# Patient Record
Sex: MALE | Race: WHITE
[De-identification: names, ages, dates, MRNs, and addresses within clinical notes are randomized per-mention and may not be internally consistent; named-entity substitution may affect disease eponyms.]

---

## 2018-03-20 ENCOUNTER — HOSPITAL ENCOUNTER (EMERGENCY)
Dept: HOSPITAL 17 - NEPJ | Age: 33
LOS: 2 days | Discharge: HOME | End: 2018-03-22
Payer: COMMERCIAL

## 2018-03-20 VITALS
DIASTOLIC BLOOD PRESSURE: 72 MMHG | SYSTOLIC BLOOD PRESSURE: 126 MMHG | OXYGEN SATURATION: 100 % | RESPIRATION RATE: 15 BRPM | TEMPERATURE: 97.6 F | HEART RATE: 92 BPM

## 2018-03-20 VITALS — HEIGHT: 66 IN | WEIGHT: 191.8 LBS | BODY MASS INDEX: 30.82 KG/M2

## 2018-03-20 VITALS — RESPIRATION RATE: 16 BRPM

## 2018-03-20 DIAGNOSIS — F14.10: ICD-10-CM

## 2018-03-20 DIAGNOSIS — F15.10: Primary | ICD-10-CM

## 2018-03-20 DIAGNOSIS — F60.2: ICD-10-CM

## 2018-03-20 PROCEDURE — 80307 DRUG TEST PRSMV CHEM ANLYZR: CPT

## 2018-03-20 PROCEDURE — 99285 EMERGENCY DEPT VISIT HI MDM: CPT

## 2018-03-20 PROCEDURE — 96374 THER/PROPH/DIAG INJ IV PUSH: CPT

## 2018-03-20 PROCEDURE — 96372 THER/PROPH/DIAG INJ SC/IM: CPT

## 2018-03-20 NOTE — PD
HPI


Chief Complaint:  Psychiatric Symptoms


Time Seen by Provider:  20:04


Travel History


International Travel<30 days:  No


Contact w/Intl Traveler<30days:  No


Traveled to known affect area:  No





History of Present Illness


HPI


This is a 32-year-old male who presents under Baker act initiated by [place] [employer].  According to his paperwork, "Shayna stated that he 

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  Shayna 

did not give any specifics as to how he would kill himself, but he was observed 

lying in the road and telling people to shoot him."  Upon my examination the 

patient is currently laying on his bed with his fitted bedcovers wrapped around 

his head and face and body.  He refuses any examination or additional 

information for history of present illness.  When asked to remove the fitted 

bedsheet from his head and face he shouted obscenities.  Additional staff were 

called and the patient was told that we would have to remove the fitted 

bedsheet from his face and head because it is unsafe and he became immediately 

aggressive, agitated,  his bed and threw it at the door.  Geodon and 

Benadryl have been ordered.  History is limited because of the above.





[facility]


Past Medical History


Medical History:  Denies Significant Hx





Past Surgical History


Surgical History:  No Previous Surgery





Social History


Alcohol Use:  No


Tobacco Use:  Yes


Substance Use:  No





Allergies-Medications


(Allergen,Severity, Reaction):  


Coded Allergies:  


     No Known Allergies (Unverified , 3/20/18)





Review of Systems


ROS Limitations:  Refused, Combative


Except as stated in HPI:  all other systems reviewed are Neg





Physical Exam


Exam Limitations:  Refused, Combative


Narrative


GENERAL: This is a white male who is lying on the bed with a fitted bedsheet 

covering his whole body.  After he removed himself from the bed it appears that 

he is alert and oriented but combative and agitated.


Additional elements of physical examination have been refused by the patient.


NEUROLOGICAL: Awake and alert. No obvious cranial nerve deficits.  Motor 

grossly within normal limits. Normal speech.


PSYCHIATRIC: Agitated and aggressive.





Data


Data


Last Documented VS





Vital Signs








  Date Time  Temp Pulse Resp B/P (MAP) Pulse Ox O2 Delivery O2 Flow Rate FiO2


 


3/20/18 18:45 97.6 92 15 126/72 (90) 100   








Orders





 Orders


Complete Blood Count With Diff (3/20/18 18:59)


Comprehensive Metabolic Panel (3/20/18 18:59)


Thyroid Stimulating Hormone (3/20/18 18:59)


Psych Screen (3/20/18 18:59)


Drug Screen, Random Urine (3/20/18 18:59)


Alcohol (Ethanol) (3/20/18 18:59)


Salicylates (Aspirin) (3/20/18 18:59)


Tylenol (Acetaminophen) (3/20/18 18:59)


Restraints Violent (3/20/18 19:25)


Ziprasidone Inj (Geodon Inj) (3/20/18 20:04)


Diphenhydramine Inj (Benadryl Inj) (3/20/18 20:15)


Ziprasidone Inj (Geodon Inj) (3/20/18 20:05)


Diphenhydramine Inj (Benadryl Inj) (3/20/18 20:06)








MDM


Medical Decision Making


Medical Screen Exam Complete:  Yes


Emergency Medical Condition:  Yes


Medical Record Reviewed:  Yes


Differential Diagnosis


Adjustment reaction, acute psychosis, substance-induced mood disorder, 

schizophrenia


Narrative Course


Mental health screening discussed with the patient. Psychiatric screen ordered.





The patient is medically clear for psychiatric disposition.





Diagnosis





 Primary Impression:  


 Medical clearance for psychiatric admission











Lauri Currie Mar 20, 2018 20:12

## 2018-03-21 VITALS
HEART RATE: 64 BPM | DIASTOLIC BLOOD PRESSURE: 61 MMHG | TEMPERATURE: 98.6 F | RESPIRATION RATE: 20 BRPM | SYSTOLIC BLOOD PRESSURE: 130 MMHG | OXYGEN SATURATION: 98 %

## 2018-03-21 VITALS
OXYGEN SATURATION: 96 % | DIASTOLIC BLOOD PRESSURE: 57 MMHG | TEMPERATURE: 98.4 F | SYSTOLIC BLOOD PRESSURE: 135 MMHG | HEART RATE: 75 BPM | RESPIRATION RATE: 18 BRPM

## 2018-03-21 VITALS — SYSTOLIC BLOOD PRESSURE: 150 MMHG | DIASTOLIC BLOOD PRESSURE: 65 MMHG | RESPIRATION RATE: 18 BRPM | HEART RATE: 58 BPM

## 2018-03-21 VITALS
DIASTOLIC BLOOD PRESSURE: 60 MMHG | RESPIRATION RATE: 16 BRPM | OXYGEN SATURATION: 98 % | SYSTOLIC BLOOD PRESSURE: 128 MMHG | HEART RATE: 65 BPM

## 2018-03-22 VITALS
DIASTOLIC BLOOD PRESSURE: 62 MMHG | RESPIRATION RATE: 16 BRPM | HEART RATE: 55 BPM | OXYGEN SATURATION: 99 % | SYSTOLIC BLOOD PRESSURE: 133 MMHG

## 2018-03-22 NOTE — PD
__________________________________________________





History of Present Illness


Chief Complaint:  Psychiatric Symptoms


Time Seen by Provider:  10:45


Travel History


International Travel<30 Days:  No


Contact w/Intl Traveler<30days:  No


Known affected area:  No





Legal Status


Legal Status:  Baker Act


Baker Act Signed By:  Tiffany BURNS


History of Present Illness:


History of Present Illness


HPI


This is a 32-year-old, , single, homeless male with no reported 

psychiatric history, with reported history of substance use disorder mainly 

amphetamines and cocaine, who presents under Baker act initiated by [place] [employer].  According to his paperwork, "Shayna stated that he 

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  Shayna 

did not give any specifics as to how he would kill himself, but he was observed 

lying in the road and telling people to shoot him."  At the time that the 

patient was placed under the Baker act he was under the influence of 

substances.  Upon arrival at the ED the patient was uncooperative and agitated.

  He responded very poorly to redirection.  When he attempted to complete 

medical clearance he became agitated and aggressive towards staff members and 

required ETO Geodon and Benadryl.  Patient was placed on [facility] waiting 

list for possible disposition.  During the time that he was here in [facility] he was 

intermittently restless and engage in activities such as throwing history 

against the wall because he did like the breakfast that was provided.





EMR is reviewed.  No previous contact with [facility] psychiatry 

Department.  Current toxicology is positive for amphetamines, cocaine, 

cannabinoids.





Patient is seen this morning.  He is alert, oriented, calm.  His speech is 

clear and logical.  He states "I made some stupid statements yesterday because 

I just needed a place.  If I wanted to hurt myself I would not say anything to 

anyone.  It was raining and hailing outside."  There is no evidence of any 

psychosis, no marialuisa, or hypomania.  Patient does not appear to be responding to 

internal stimuli.  The patient denies any suicidal or homicidal ideation, 

intent or plan.  He further denies that he has any problems as substances and 

states that he simply" just buys drugs and uses drugs".  He shows was 

interested in seeking treatment for his substance use disorder.  Strong 

antisocial personality traits are noted in his interactions with staff.





[facility]


Past Medical History


Medical History:  Denies Significant Hx





Past Surgical History


Surgical History:  No Previous Surgery





Psychiatric History


Psychiatric History


Hx Psychiatric Treatment:  


Denies any.  Denies any previous suicide attempts.  Denies any


self-injurious behaviors.


History of Inpatient Treatment:  No


Guns or firearms in home:  No





Social History


Single, homeless, unemployed.  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


Hx Alcohol Use:  No


Hx Tobacco Use:  Yes


Hx Substance Use:  Yes (POLYSUBSTANCE)


Substance Use Type:  Marijuana, Amphetamines-Stimulants, Cocaine


Hx of Substance Use Treatment:  No





Family Psychiatric History


Denies





Allergies-Medications


(Allergen,Severity, Reaction):  


Coded Allergies:  


     No Known Allergies (Unverified , 3/20/18)





Review of Systems


Psychiatric:  DENIES: Anxiety, Confusion, Mood changes, Depression, 

Hallucinations, Agitation, Suicidal Ideation, Homicidal Ideation, Delusions





Mental Status Examination


Appearance:  Appropriate, Disheveled


Consciousness:  Alert


Orientation:  x4


Motor Activity:  Normal gait


Speech:  Unremarkable


Language:  Adequate


Fund of Knowledge:  Adequate


Attention and Concentration:  Adequate


Memory:  Unremarkable


Mood:  Appropriate, Oppositional


Affect:  Appropriate


Thought Process & Associations:  Intact


Thought Content:  Appropriate


Hallucination Type:  None


Delusion Type:  None


Suicidal Ideation:  No


Suicidal Plan:  No


Suicidal Intention:  No


Homicidal Ideation:  No


Homicidal Plan:  No


Homicidal Intention:  No


Insight:  Poor


Judgment:  Impulsive





MDM


Medical Decision Making


Medical Record Reviewed:  Yes


Assessment/Plan


This is a 32-year-old, , single, homeless male with no reported 

psychiatric history, with reported history of substance use disorder mainly 

amphetamines and cocaine, who presents under Baker act initiated by [place] [employer].  According to his paperwork, "Shayna stated that he 

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  Shayna 

did not give any specifics as to how he would kill himself, but he was observed 

lying in the road and telling people to shoot him."  At the time that the 

patient was placed under the Baker act he was under the influence of 

substances.  His toxicology report is positive for amphetamines, cocaine, 

cannabinoids.  The patient was allowed to sober up here clinically under 

supervision.  He presented no suicidality.  He did present some behavioral 

issues including throwing his food around but these are as a result of his 

antisocial personality traits and not as a result of a mental illness.  The 

patient denies any suicidal or homicidal ideation, intent or plan.  He is not 

psychotic and is not manic.  He is requesting to be discharge.  He shows no 

interest in refuse any information regarding substance abuse treatment.  At 

this time he does not meet criteria for Tripp act.  The Baker act is lifted.  

Patient is psychiatrically clear to be discharge from the ED.





Orders





 Orders


Diet Regular Basic (3/21/18 Lunch)


Diet Regular Basic (3/21/18 Dinner)


Diphenhydramine Inj (Benadryl Inj) (3/21/18 19:30)


Diphenhydramine Inj (Benadryl Inj) (3/21/18 19:16)


Diet Regular Basic (3/22/18 Breakfast)


Diet Regular Basic (3/22/18 Lunch)





Results





Vital Signs








  Date Time  Temp Pulse Resp B/P (MAP) Pulse Ox O2 Delivery O2 Flow Rate FiO2


 


3/22/18 06:42  55 16 133/62 (85) 99   


 


3/21/18 23:52  65 16 128/60 (82) 98 Room Air  


 


3/21/18 18:53 98.6 64 20 130/61 (84) 98   











Diagnosis





 Primary Impression:  


 Amphetamine abuse


 Additional Impressions:  


 Cocaine abuse


 Substance induced mood disorder


Psychiatrically Cleared:  Yes


***Med/ Other Pt Specific Info:  No Meds Exist/No RX given


Disposition:  01 DISCHARGE HOME


Condition:  Stable





Problem Qualifiers











Marisol Oh Mar 22, 2018 10:53

## 2018-03-22 NOTE — PD
Physical Exam


Date Seen by Provider:  Mar 22, 2018


Time Seen by Provider:  10:51


Narrative


32-year-old male previously Tripp acted and cleared medically for psychiatric 

evaluation, has been seen and evaluated by the psychiatric staff and deemed 

psychiatrically stable for discharge at this time.  Patient remains medically 

stable at this time.  Psychiatric follow-up will be based on the psychiatric 

note.





Data


Data


Last Documented VS





Vital Signs








  Date Time  Temp Pulse Resp B/P (MAP) Pulse Ox O2 Delivery O2 Flow Rate FiO2


 


3/22/18 06:42  55 16 133/62 (85) 99   


 


3/21/18 23:52      Room Air  


 


3/21/18 18:53 98.6       








Orders





 Orders


Complete Blood Count With Diff (3/20/18 18:59)


Comprehensive Metabolic Panel (3/20/18 18:59)


Thyroid Stimulating Hormone (3/20/18 18:59)


Psych Screen (3/20/18 18:59)


Drug Screen, Random Urine (3/20/18 18:59)


Alcohol (Ethanol) (3/20/18 18:59)


Salicylates (Aspirin) (3/20/18 18:59)


Tylenol (Acetaminophen) (3/20/18 18:59)


Restraints Violent (3/20/18 19:25)


Ziprasidone Inj (Geodon Inj) (3/20/18 20:04)


Diphenhydramine Inj (Benadryl Inj) (3/20/18 20:15)


Ziprasidone Inj (Geodon Inj) (3/20/18 20:05)


Diphenhydramine Inj (Benadryl Inj) (3/20/18 20:06)


Diet Regular Basic (3/21/18 Breakfast)


Diet Regular Basic (3/21/18 Lunch)


Diet Regular Basic (3/21/18 Dinner)


Diphenhydramine Inj (Benadryl Inj) (3/21/18 19:30)


Diphenhydramine Inj (Benadryl Inj) (3/21/18 19:16)


Diet Regular Basic (3/22/18 Breakfast)


Diet Regular Basic (3/22/18 Lunch)





Labs





Laboratory Tests








Test


  3/21/18


02:35


 


Urine Opiates Screen NEG 


 


Urine Barbiturates Screen NEG 


 


Urine Amphetamines Screen POS 


 


Urine Benzodiazepines Screen NEG 


 


Urine Cocaine Screen POS 


 


Urine Cannabinoids Screen POS 











MDM


Medical Record Reviewed:  Yes


Supervised Visit with LARS:  Yes


Narrative Course


32-year-old male previously Tripp acted and cleared medically for psychiatric 

evaluation, has been seen and evaluated by the psychiatric staff and deemed 

psychiatrically stable for discharge at this time.  Patient remains medically 

stable at this time.  Psychiatric follow-up will be based on the psychiatric 

note.


Diagnosis





 Primary Impression:  


 Medical clearance for psychiatric admission


Referrals:  


[facility]


Patient Instructions:  General Instructions


Disposition:  01 DISCHARGE HOME


Condition:  Stable











Noah Howard Mar 22, 2018 10:53

## 2018-03-27 ENCOUNTER — HOSPITAL ENCOUNTER (EMERGENCY)
Dept: HOSPITAL 17 - NEPD | Age: 33
LOS: 1 days | Discharge: HOME | End: 2018-03-28
Payer: COMMERCIAL

## 2018-03-27 VITALS
DIASTOLIC BLOOD PRESSURE: 74 MMHG | OXYGEN SATURATION: 97 % | HEART RATE: 65 BPM | SYSTOLIC BLOOD PRESSURE: 124 MMHG | TEMPERATURE: 98.8 F | RESPIRATION RATE: 20 BRPM

## 2018-03-27 VITALS — RESPIRATION RATE: 22 BRPM | SYSTOLIC BLOOD PRESSURE: 132 MMHG | DIASTOLIC BLOOD PRESSURE: 79 MMHG | HEART RATE: 97 BPM

## 2018-03-27 VITALS
TEMPERATURE: 98 F | DIASTOLIC BLOOD PRESSURE: 80 MMHG | SYSTOLIC BLOOD PRESSURE: 122 MMHG | HEART RATE: 72 BPM | OXYGEN SATURATION: 98 % | RESPIRATION RATE: 16 BRPM

## 2018-03-27 VITALS
HEART RATE: 78 BPM | DIASTOLIC BLOOD PRESSURE: 81 MMHG | TEMPERATURE: 97.8 F | SYSTOLIC BLOOD PRESSURE: 127 MMHG | RESPIRATION RATE: 20 BRPM | OXYGEN SATURATION: 98 %

## 2018-03-27 VITALS — BODY MASS INDEX: 28.41 KG/M2 | WEIGHT: 191.8 LBS | HEIGHT: 69 IN

## 2018-03-27 DIAGNOSIS — R45.4: ICD-10-CM

## 2018-03-27 DIAGNOSIS — Z72.0: ICD-10-CM

## 2018-03-27 DIAGNOSIS — F60.2: ICD-10-CM

## 2018-03-27 DIAGNOSIS — Z59.0: ICD-10-CM

## 2018-03-27 DIAGNOSIS — F15.259: Primary | ICD-10-CM

## 2018-03-27 LAB
ALBUMIN SERPL-MCNC: 3.4 GM/DL (ref 3.4–5)
ALP SERPL-CCNC: 59 U/L (ref 45–117)
ALT SERPL-CCNC: 22 U/L (ref 12–78)
AST SERPL-CCNC: 17 U/L (ref 15–37)
BASOPHILS # BLD AUTO: 0.1 TH/MM3 (ref 0–0.2)
BASOPHILS NFR BLD: 0.6 % (ref 0–2)
BILIRUB SERPL-MCNC: 0.2 MG/DL (ref 0.2–1)
BUN SERPL-MCNC: 12 MG/DL (ref 7–18)
CALCIUM SERPL-MCNC: 8.6 MG/DL (ref 8.5–10.1)
CHLORIDE SERPL-SCNC: 103 MEQ/L (ref 98–107)
CREAT SERPL-MCNC: 0.72 MG/DL (ref 0.6–1.3)
EOSINOPHIL # BLD: 0.1 TH/MM3 (ref 0–0.4)
EOSINOPHIL NFR BLD: 1.4 % (ref 0–4)
ERYTHROCYTE [DISTWIDTH] IN BLOOD BY AUTOMATED COUNT: 13.2 % (ref 11.6–17.2)
GFR SERPLBLD BASED ON 1.73 SQ M-ARVRAT: 127 ML/MIN (ref 89–?)
GLUCOSE SERPL-MCNC: 79 MG/DL (ref 74–106)
HCO3 BLD-SCNC: 29.1 MEQ/L (ref 21–32)
HCT VFR BLD CALC: 41.8 % (ref 39–51)
HGB BLD-MCNC: 14.2 GM/DL (ref 13–17)
LYMPHOCYTES # BLD AUTO: 3.7 TH/MM3 (ref 1–4.8)
LYMPHOCYTES NFR BLD AUTO: 43.4 % (ref 9–44)
MCH RBC QN AUTO: 30.2 PG (ref 27–34)
MCHC RBC AUTO-ENTMCNC: 33.9 % (ref 32–36)
MCV RBC AUTO: 88.9 FL (ref 80–100)
MONOCYTE #: 0.5 TH/MM3 (ref 0–0.9)
MONOCYTES NFR BLD: 5.8 % (ref 0–8)
NEUTROPHILS # BLD AUTO: 4.2 TH/MM3 (ref 1.8–7.7)
NEUTROPHILS NFR BLD AUTO: 48.8 % (ref 16–70)
PLATELET # BLD: 357 TH/MM3 (ref 150–450)
PMV BLD AUTO: 7 FL (ref 7–11)
PROT SERPL-MCNC: 7 GM/DL (ref 6.4–8.2)
RBC # BLD AUTO: 4.7 MIL/MM3 (ref 4.5–5.9)
SODIUM SERPL-SCNC: 141 MEQ/L (ref 136–145)
WBC # BLD AUTO: 8.6 TH/MM3 (ref 4–11)

## 2018-03-27 PROCEDURE — 80307 DRUG TEST PRSMV CHEM ANLYZR: CPT

## 2018-03-27 PROCEDURE — 84443 ASSAY THYROID STIM HORMONE: CPT

## 2018-03-27 PROCEDURE — 80053 COMPREHEN METABOLIC PANEL: CPT

## 2018-03-27 PROCEDURE — 99284 EMERGENCY DEPT VISIT MOD MDM: CPT

## 2018-03-27 PROCEDURE — 85025 COMPLETE CBC W/AUTO DIFF WBC: CPT

## 2018-03-27 PROCEDURE — 96372 THER/PROPH/DIAG INJ SC/IM: CPT

## 2018-03-27 SDOH — ECONOMIC STABILITY - HOUSING INSECURITY: HOMELESSNESS: Z59.0

## 2018-03-27 NOTE — PD
HPI


Chief Complaint:  Psychiatric Symptoms


Time Seen by Provider:  02:08


Travel History


International Travel<30 days:  No


Contact w/Intl Traveler<30days:  No


Traveled to known affect area:  No





History of Present Illness


HPI


32-year-old white male presents emergency department under Baker act by PD.  

Patient has a history of substance abuse.  He has been injecting 

methamphetamine.  The patient was found to be acutely confused and psychotic.  

He was placed under Baker act and brought to the ER.  The patient here is 

refusing to answer any questions.  He is not cooperating.  He became aggressive 

towards staff during his intake and was taken down by security.  The patient is 

medicated with Haldol 10 mg and Ativan 2 mg IM for his agitation.  Violent 

restraints have been ordered.





Critical access hospital


Past Medical History


*** Narrative Medical


IV substance abuse


Diminished Hearing:  No


Immunizations Current:  Yes


Tetanus Vaccination:  Unknown


Influenza Vaccination:  No





Past Surgical History


Surgical History:  No Previous Surgery





Social History


Alcohol Use:  Yes


Tobacco Use:  Yes


Substance Use:  Yes (POLYSUBSTANCE)





Allergies-Medications


(Allergen,Severity, Reaction):  


Coded Allergies:  


     No Known Allergies (Unverified , 3/27/18)


Reported Meds & Prescriptions





Reported Meds & Active Scripts


Active


No Active Prescriptions or Reported Medications    








Review of Systems


ROS Limitations:  Uncooperative, Psychotic





Physical Exam


Narrative


GENERAL: Well-nourished, well-developed patient.  Patient is uncooperative.


SKIN: Warm and dry.


HEAD: Normocephalic and atraumatic.


EYES: No scleral icterus. No injection or drainage. 


ENT: No nasal drainage noted. Mucous membranes pink. Airway patent.


NECK: Supple, trachea midline.  Moves head freely without obvious discomfort.


CARDIOVASCULAR: Regular rate and rhythm without murmurs, gallops, or rubs. 


RESPIRATORY: Breath sounds equal bilaterally. No accessory muscle use.


GASTROINTESTINAL: Abdomen soft, non-tender, nondistended. 


EXTREMITIES: No cyanosis or edema.


BACK: Nontender without obvious deformity. No CVA tenderness.


NEURO: Patient is alert and oriented. no sensorimotor deficits.  Nonfocal.  

Normal speech.


PSYCH: Acutely psychotic and uncooperative.





Data


Data


Last Documented VS





Vital Signs








  Date Time  Temp Pulse Resp B/P (MAP) Pulse Ox O2 Delivery O2 Flow Rate FiO2


 


3/27/18 02:23 97.8 78 20 127/81 (96) 98 Room Air  








Orders





 Orders


Complete Blood Count With Diff (3/27/18 02:08)


Comprehensive Metabolic Panel (3/27/18 02:08)


Thyroid Stimulating Hormone (3/27/18 02:08)


Psych Screen (3/27/18 02:08)


Haloperidol Inj (Haldol Inj) (3/27/18 02:15)


Lorazepam Inj (Ativan Inj) (3/27/18 02:15)


Restraints Violent (3/27/18 02:08)


Drug Screen, Random Urine (3/27/18 02:08)








MDM


Medical Decision Making


Medical Screen Exam Complete:  Yes


Emergency Medical Condition:  Yes


Medical Record Reviewed:  Yes


Differential Diagnosis


MDM: High


Differential diagnoses: Schizophrenia, schizoaffective disorder, bipolar, 

anxiety, depression, adjustment reaction, mood disorder NOS, ODD, depressive 

disorder NOS, dementia, dementia with agitation, psychosis NOS, substance 

induced mood disorder, DMDD, Asperger syndrome, infection,electrolyte 

abnormality, malingering.


Narrative Course


Mental health screening discussed with the patient.  Psychiatric screen ordered.





The patient is uncooperative and combative.  He was taken down by security on 

intake.  The patient is accompanied by PD in handcuffs.  Due to the patient's 

aggressive behavior and agitation and psychotic presentation violent restraints 

have been ordered.  He is also medicated with Haldol 10 mg and Ativan 2 mg IM.  

Once the patient becomes more compliant laboratory tests will be performed.





The patient was de-escalated and he was not placed into physical restraints.  

He was medicated and has now been compliant.  Medical staff will collect his 

blood and urine.





This is medical clearance for psychiatric admission, substance induced psychosis

, polysubstance abuse





Diagnosis





 Primary Impression:  


 Medical clearance for psychiatric admission


 Additional Impressions:  


 Substance-induced psychosis


 Polysubstance abuse


Scripts


No Active Prescriptions or Reported Meds


Condition:  Arturo Herrera Mar 27, 2018 02:45

## 2018-03-28 VITALS
RESPIRATION RATE: 18 BRPM | HEART RATE: 98 BPM | TEMPERATURE: 98.1 F | OXYGEN SATURATION: 99 % | SYSTOLIC BLOOD PRESSURE: 139 MMHG | DIASTOLIC BLOOD PRESSURE: 85 MMHG

## 2018-03-28 VITALS
DIASTOLIC BLOOD PRESSURE: 62 MMHG | OXYGEN SATURATION: 98 % | SYSTOLIC BLOOD PRESSURE: 142 MMHG | RESPIRATION RATE: 18 BRPM | HEART RATE: 65 BPM

## 2018-03-28 NOTE — PD.PSY.CON
Provisional Diagnosis


Admission Date





Axis I.


Polysubstance dependence including cocaine, cannabis, amphetamines


Axis II.


Antisocial personality disorder


Axis III.


No significant medical history


Axis IV.


Homelessness


Axis V.


55





History of Present Illness


Service


Psychiatry


Consult Requested By


Psychiatry


Reason for Consult


Under Baker act


Primary Care Physician


Unknown


HPI


The patient was seen this morning at 7:30 AM.





The patient is a 32-year-old  man, single, unemployed, homeless, with 

psychiatric history of polysubstance dependence including amphetamine, cocaine, 

cannabis, antisocial personality disorder, who was seen and clear in the ER a 

week ago with a similar presentation today, no significant medical history, who 

presents emergency department under Tripp act by PD.   He reports that he has 

been injecting methamphetamine, using cocaine and cannabis.  The patient was 

found to be acutely confused and psychotic.  He was placed under Baker act and 

brought to the ER.  The patient here is refusing to answer any questions.  Upon 

his arrival to the ER the patient became violent and hostile, he was medicated 

with Haldol 5, Benadryl 50 and Ativan 2.  Today for psychiatric evaluation the 

patient continues to be irritable, very verbally disrespectful, requesting 

breakfast.  Patient states that "get me out of this for can place, but give me 

with food first".  He denies suicidal and homicidal ideation, he denies visual 

and auditory hallucinations.  He also requests to be medicated with Ativan.  He 

reports daily use of amphetamines, cocaine and cannabis.





Review of Systems


Constitutional:  DENIES: Diaphoretic episodes, Fatigue, Fever, Weight gain, 

Weight loss, Chills, Dizziness, Change in appetite, Night Sweats


Endocrine:  DENIES: Heat/cold intolerance, Polydipsia, Polyuria, Polyphagia


Eyes:  DENIES: Blurred vision, Diplopia, Eye inflammation, Eye pain, Vision loss

, Photosensitivity, Double Vision


Ears, nose, mouth, throat:  DENIES: Tinnitus, Hearing loss, Vertigo, Nasal 

discharge, Oral lesions, Throat pain, Hoarseness, Ear Pain, Running Nose, 

Epistaxis, Sinus Pain, Toothache, Odynophagia


Respiratory:  DENIES: Apneas, Cough, Snoring, Wheezing, Hemoptysis, Sputum 

production, Shortness of breath


Cardiovascular:  DENIES: Chest pain, Palpitations, Syncope, Dyspnea on Exertion

, PND, Lower Extremity Edema, Orthopnea, Claudication


Gastrointestinal:  DENIES: Abdominal pain, Black stools, Bloody stools, 

Constipation, Diarrhea, Nausea, Vomiting, Difficulty Swallowing, Anorexia


Genitourinary:  DENIES: Sexual dysfunction, Urinary frequency, Urinary 

incontinence, Urgency, Hematuria, Dysuria, Nocturia, Penile Discharge, 

Testicular Pain, Testicular Swelling


Musculoskeletal:  DENIES: Joint pain, Muscle aches, Stiffness, Joint Swelling, 

Back pain, Neck pain


Integumentary:  DENIES: Abnormal pigmentation, Nail changes, Pruritus, Rash


Hematologic/lymphatic:  DENIES: Bruising, Lymphadenopathy


Immunologic/allergic:  DENIES: Eczema, Urticaria


Neurologic:  DENIES: Abnormal gait, Headache, Localized weakness, Paresthesias, 

Seizures, Speech Problems, Tremor, Poor Balance


Psychiatric:  DENIES: Anxiety, Confusion, Mood changes, Depression, 

Hallucinations, Agitation, Suicidal Ideation, Homicidal Ideation, Delusions





Past Family Social History


Coded Allergies:  


     No Known Allergies (Unverified , 3/27/18)


No Active Prescriptions or Reported Meds





Physical Exam


Vital Signs





Vital Signs








  Date Time  Temp Pulse Resp B/P (MAP) Pulse Ox O2 Delivery O2 Flow Rate FiO2


 


3/28/18 10:00        


 


3/28/18 06:47  65 18  98 Room Air  


 


3/28/18 02:15 98.1       











Mental Status Examination


Appearance:  Appropriate


Consciousness:  Alert


Orientation:  x4


Motor Activity:  Normal gait


Speech:  Unremarkable


Language:  Adequate


Fund of Knowledge:  Adequate


Attention and Concentration:  Adequate


Memory:  Unremarkable


Mood:  Appropriate


Affect:  Irritable


Thought Process & Associations:  Intact


Thought Content:  Appropriate


Hallucination Type:  None


Delusion Type:  None


Suicidal Ideation:  No


Suicidal Plan:  No


Suicidal Intention:  No


Homicidal Ideation:  No


Homicidal Plan:  No


Homicidal Intention:  No


Insight:  Fair


Judgment:  Impulsive





Assessment & Plan


Problem List:  


(1) Polysubstance (excluding opioids) dependence


ICD Codes:  F19.20 - Other psychoactive substance dependence, uncomplicated


Assessment & Plan:  Any evidence of neuropsychiatric symptoms require an 

immediate psychiatric intervention or psychiatric hospitalizations.  The 

patient denies suicidal or homicidal ideation, he denies visual and auditory 

hallucinations.  Recent psychotic behavior for which the patient was brought on 

the Tripp at his very well explained by cocaine/amphetamines, cannabis 

intoxication.  During my assessment the patient presents as strong cluster B 

traits that definitely have clinical significance.  Patient most probably 

suffer of antisocial personality disorder.  He does not meet criteria for 

involuntary psychiatric admission at this moment.  Motivation psychoeducation 

provided.  Tripp act will be lifted





Assessment & Plan


Estimated LOS:  days











Rao Monreal MD Mar 28, 2018 11:49
